# Patient Record
Sex: FEMALE | URBAN - METROPOLITAN AREA
[De-identification: names, ages, dates, MRNs, and addresses within clinical notes are randomized per-mention and may not be internally consistent; named-entity substitution may affect disease eponyms.]

---

## 2024-11-04 ENCOUNTER — TELEPHONE (OUTPATIENT)
Age: 79
End: 2024-11-04

## 2025-01-21 PROBLEM — H25.11 NUCLEAR SCLEROTIC CATARACT OF RIGHT EYE: Status: ACTIVE | Noted: 2023-06-22

## 2025-01-21 PROBLEM — G31.84 MCI (MILD COGNITIVE IMPAIRMENT) WITH MEMORY LOSS: Status: ACTIVE | Noted: 2023-04-05

## 2025-02-05 ENCOUNTER — TELEPHONE (OUTPATIENT)
Age: 80
End: 2025-02-05

## 2025-02-05 NOTE — TELEPHONE ENCOUNTER
LSW attempted to call [facility] Jazlyn who made the appointment to confirm appointment for tomorrow and confirm that someone would be attending appt with patient. LSW was transferred to [facility] nurse Mejia who confirmed appt & would call back LSW to who would be attending appointment with patient as she stated she did not know someone would need to attend with her. No other contacts listed on chart.

## 2025-02-18 RX ORDER — DONEPEZIL HYDROCHLORIDE 10 MG/1
10 TABLET, FILM COATED ORAL
COMMUNITY
Start: 2024-03-20 | End: 2025-02-24 | Stop reason: SDDI

## 2025-02-18 RX ORDER — HYDROXYZINE HYDROCHLORIDE 10 MG/1
10 TABLET, FILM COATED ORAL EVERY 6 HOURS PRN
COMMUNITY
Start: 2025-01-20

## 2025-02-18 NOTE — PROGRESS NOTES
[facility]  [address], [address]  [place] [address]  [phone]    SENIOR CARE CONSULT APPOINTMENT    GERIATRIC EVALUATION - ASSESSMENT AND PLAN:     1. 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)  Assessment & Plan:  MOCA: 16/30  Memory Loss/Deficits: visuospatial, attention, delayed recall   Patient is becoming more dependent with ADLs/dependent with IADLS. Lives with her  and her daughter.   Recent Labs/Imaging: No recent labs will order lab work at this time.  No recent MRI completed will order MRI with neuro quant.  Memory medications: Aricept 10 mg at bedtime, but she is not taking as prescribed.   Weight loss since last visit: Has remained stable.  Safe Environment:    Driving-patient has an active license, family has not allowed her to drive in a significant period of time.  Due to increasing concerns with cognition will submit paperwork for 's license to be revoked.  Falls-reports no issues with ambulation at this time.  No need for PT/OT services at this time.  Medication Management-does not take her medications as prescribed.  She refuses to take majority of her medications, educated the need for medication compliance.  Daughter has attempted to assist patient but she is not willing to take medications.  Caregiver stress: Social work discussed other additional resources in the home with daughter.  Monitor for changes in mood, sleep, pain control.  Notify provider if any concerns  Optimize all acute and chronic conditions.  Continue to follow-up with PCP and specialist as directed for management  Vascular risk factors: N/A  Geriatric syndromes: Cataracts, mild cognitive impairment  Changes in chronic conditions: Continues to follow-up with PCP for management of symptoms.  Ensure adequate hydration, good nutrition  Plan to order labs, MRI with neuro quant, speech  therapy  Recommended next follow up: Scheduled care conference.   Orders:  -     MRI brain NeuroQuant wo contrast; Future; Expected date: 03/03/2025  -     TSH, 3rd generation with Free T4 reflex; Future; Expected date: 03/24/2025  -     Vitamin B12/Folate, Serum Panel; Future; Expected date: 03/24/2025  -     Ambulatory Referral to Speech Therapy; Future  2. Driving safety issue  Assessment & Plan:  Increasing concerns from family, and due to increasing issues with cognitive impairment.  Reports patient is getting lost, and is not able to make appropriate decisions when behind the wheel.  Reports she is not safe behind the wheel at all.  Patient is no longer driving due to these increasing concerns.   Due to increasing concerns for cognition, will order for license to be revoked via [employer].  Patient and daughter agreed with this plan at this time.  Will submit paperwork, educated patient she is no longer to drive.  3. Mood   Patient is pleasant and interactive today in office.   GDS: 2.  Denies SI.  Continues on Atarax every 6 hours as needed, but she refuses to take her medications at times.   Encouraged stress relief techniques, relaxation, and emotional support.   4. Ambulation   Patient reports ambulating without any assistive devices.   Denies any recent falls.   No need for PT/OT at this time.   5. Sleep   Patient reports sleeping well at nighttime.   Does not use any sleep aides for restful sleep.   Encouraged good sleep hygiene, avoid daytime napping, avoiding OTC medications that may cause oversedation/increased confusion.   6. Medication Review   Reviewed medications in patients medication list.   Patients medication list is in compliance with the Beers List for Geriatric Medication recommendations.   Educated to avoid OTC medications such as Benadryl that may increase symptoms of confusion.   Recommended to report any changes in medications to ensure proper medication reconciliation to prevent adverse  events/side effects.       HPI:      Today I had the pleasure of evaluating Monik Hoff who is a 79 y.o. female in a Geriatric Consultation today. Ms. Hoff is in the office with her  daughter Cassandra. She lives with her . Previous MOCA: No previous MoCA on file.  Comorbidities include cataracts and mild cognitive impairment.     Patient's work and family history: she was an .     Memory report per Cassandra (d/t memory issues): she was diagnosed about 3 years ago with dementia. As she is talking she will forget what she is saying mid sentence and cannot pull out what she is supposed to say. She does have a caregiver that comes out to assist, she needs assistance with ADLs/IADLs. Needs to be reoriented, she needs to be reminded to complete tasks. Her  is her caregiver, but he is unable to physically care for her. Recently fell victim to a scam where her account was drained, she does not have financial access anymore. Hoards a lot of papers, will impulsively buy things. Family has really started to take control of her finances so she does not make any choices that may impact her negatively.     Memory report per patient:  her memory is okay, but she does have some short term memory issues. Having more issues with attention. She has her own set routine that she is great with. But does need reminders.     Current activities:  Cognitive:  lots of puzzles, reading, and different activities.  Physical:  not formal exercise, but she does walk a lot.  Social:  goes to senior care center 3x a week.         ADL's:  Do you do your own bathing - independent once reminded, cued   Dressing - independent, needs to be helped will rewear the same outfit for days on end   Feeding yourself - independent   Tolieting - independent   Continence - issues with urinary continence at times   Transferring - not using any assistive devices, no recent falls.       IADL's:   She has problems operating household  "appliance such as TV remote, kitchen appliances, computer.  Can you make your own light meals Yes - issues with leaving the stove on, cannot figure out how to work appliances   Do light cleaning/chores Yes - having increasing issues   Do you take care of your own medications No - she is not taking her medications as prescribed.   Do you do your own shopping No  Do you handle your own financial affairs such as balancing your checkbook, paying bills, investments: No  Do have any difficulties with handling your financial affairs: Yes  Do you use a cell phone Yes  Do you drive: No - does have an active license but does not drive.     Psychosocial concerns:  She has difficulty finding the right word while speaking: Yes  Patient requires repeat information or ask the same question repeatedly: No  Have you or your family noted any change in your mood or personality:No  Are you currently or have you been treated in the past for depression or anxiety: No  Any hallucination or delusion: No  Sleep Issues: No  Hearing and vision issue: No  Appetite/swallow:  appetite is good, no issues with swallowing   Pain:  no pain reported     Family Review of Behavior [facility]:    pacing. Yes    agressive/combative behavior. No    agitated. Yes   wandering. Yes - has her location shared   resistance to care. No   hoarding/hiding objects.Yes    suspicious  No  withdrawn Yes  rummaging/pillaging.No    misplacing/losing objects Yes  personal hygiene problems.Yes - needs to be reminded constantly   forgetfulness of actions Yes     Family/Medical History:   Family member with dementia and what type? Yes - aunts   Does patient have previous diagnosis of dementia?  Cognitive impairment     Does patient take any \"memory medications\"?  Aricept 10 mg at bedtime  Stroke history No  Have you had any head trauma No  Does patient have history of alcohol abuse No    REVIEW OF SYSTEMS:      Review of Systems   HENT:  Positive for hearing loss.    Eyes:  " Positive for visual disturbance.   Gastrointestinal:  Positive for diarrhea (chronic).   Musculoskeletal:  Positive for arthralgias and gait problem.   Psychiatric/Behavioral:  Positive for decreased concentration.    All other systems reviewed and are negative.        OBJECTIVE:       Vitals:  Vitals:    02/24/25 1312   BP: 134/70   Pulse: 74   Temp: 97.6 °F (36.4 °C)   SpO2: 97%       Physical Exam  Observed Ambulation:  independent with no assistive devices   Physical Exam  Constitutional:       General: She is not in acute distress.     Appearance: Normal appearance. She is normal weight. She is not ill-appearing.   HENT:      Head: Normocephalic and atraumatic.   Cardiovascular:      Rate and Rhythm: Normal rate and regular rhythm.      Pulses: Normal pulses.      Heart sounds: Normal heart sounds. No murmur heard.  Pulmonary:      Effort: Pulmonary effort is normal. No respiratory distress.      Breath sounds: Normal breath sounds. No wheezing.   Abdominal:      General: Bowel sounds are normal. There is no distension.      Palpations: Abdomen is soft.      Tenderness: There is no abdominal tenderness.   Musculoskeletal:         General: Normal range of motion.      Cervical back: Normal range of motion and neck supple.      Right lower leg: No edema.      Left lower leg: No edema.   Skin:     General: Skin is warm and dry.      Capillary Refill: Capillary refill takes less than 2 seconds.   Neurological:      General: No focal deficit present.      Mental Status: She is alert and oriented to person, place, and time. Mental status is at baseline.   Psychiatric:         Mood and Affect: Mood normal.         Behavior: Behavior normal.           HISTORY:     History:  History reviewed. No pertinent past medical history.  History reviewed. No pertinent surgical history.  History reviewed. No pertinent family history.  Social History     Socioeconomic History    Marital status: Unknown     Spouse name: Not on file     Number of children: Not on file    Years of education: Not on file    Highest education level: Not on file   Occupational History    Not on file   Tobacco Use    Smoking status: Former     Types: Cigarettes    Smokeless tobacco: Never   Substance and Sexual Activity    Alcohol use: Not on file    Drug use: Not on file    Sexual activity: Not on file   Other Topics Concern    Not on file   Social History Narrative    Not on file     Social Drivers of Health     Financial Resource Strain: Not on file   Food Insecurity: Not on file   Transportation Needs: Not on file   Physical Activity: Not on file   Stress: Not on file   Social Connections: Not on file   Intimate Partner Violence: Not on file   Housing Stability: Not on file     History reviewed. No pertinent surgical history.    Allergies:   Allergies   Allergen Reactions    Nitrofurantoin Anaphylaxis    Sulfa Antibiotics Hives       Medications:      Current Outpatient Medications:     Artificial Tear Solution (GenTeal Tears) 0.1-0.2-0.3 % SOLN, Administer 1 drop to both eyes every 6 (six) hours as needed (dry eyes), Disp: , Rfl:     Cholecalciferol 50 MCG (2000 UT) CAPS, Take 1 capsule by mouth daily, Disp: , Rfl:     hydrOXYzine HCL (ATARAX) 10 mg tablet, Take 10 mg by mouth every 6 (six) hours as needed for anxiety, Disp: , Rfl:     LACTOBACILLUS PROBIOTIC PO, Take 1 capsule by mouth daily, Disp: , Rfl:     Turmeric (QC Tumeric Complex) 500 MG CAPS, Take 1 tablet by mouth daily, Disp: , Rfl:     donepezil (ARICEPT) 10 mg tablet, Take 10 mg by mouth daily at bedtime (Patient not taking: Reported on 2/24/2025), Disp: , Rfl:       Past Medical, surgical, social, medication and allergy history and patients previous records reviewed.      DUGLAS Conner  02/24/25  3:12 PM

## 2025-02-24 ENCOUNTER — APPOINTMENT (OUTPATIENT)
Dept: LAB | Facility: CLINIC | Age: 80
End: 2025-02-24
Payer: MEDICARE

## 2025-02-24 ENCOUNTER — OFFICE VISIT (OUTPATIENT)
Age: 80
End: 2025-02-24
Payer: MEDICARE

## 2025-02-24 VITALS
HEIGHT: 70 IN | SYSTOLIC BLOOD PRESSURE: 134 MMHG | BODY MASS INDEX: 23.62 KG/M2 | DIASTOLIC BLOOD PRESSURE: 70 MMHG | OXYGEN SATURATION: 97 % | WEIGHT: 165 LBS | TEMPERATURE: 97.6 F | HEART RATE: 74 BPM

## 2025-02-24 DIAGNOSIS — G30.1 MILD LATE ONSET ALZHEIMER DEMENTIA WITHOUT BEHAVIORAL DISTURBANCE, PSYCHOTIC DISTURBANCE, MOOD DISTURBANCE, OR ANXIETY (HCC): ICD-10-CM

## 2025-02-24 DIAGNOSIS — F02.A0 MILD LATE ONSET ALZHEIMER DEMENTIA WITHOUT BEHAVIORAL DISTURBANCE, PSYCHOTIC DISTURBANCE, MOOD DISTURBANCE, OR ANXIETY (HCC): Primary | ICD-10-CM

## 2025-02-24 DIAGNOSIS — F02.A0 MILD LATE ONSET ALZHEIMER DEMENTIA WITHOUT BEHAVIORAL DISTURBANCE, PSYCHOTIC DISTURBANCE, MOOD DISTURBANCE, OR ANXIETY (HCC): ICD-10-CM

## 2025-02-24 DIAGNOSIS — G30.1 MILD LATE ONSET ALZHEIMER DEMENTIA WITHOUT BEHAVIORAL DISTURBANCE, PSYCHOTIC DISTURBANCE, MOOD DISTURBANCE, OR ANXIETY (HCC): Primary | ICD-10-CM

## 2025-02-24 DIAGNOSIS — Z91.89 DRIVING SAFETY ISSUE: ICD-10-CM

## 2025-02-24 PROBLEM — F02.B0 MODERATE LATE ONSET ALZHEIMER'S DEMENTIA WITHOUT BEHAVIORAL DISTURBANCE, PSYCHOTIC DISTURBANCE, MOOD DISTURBANCE, OR ANXIETY (HCC): Status: ACTIVE | Noted: 2025-02-24

## 2025-02-24 LAB
FOLATE SERPL-MCNC: >22.3 NG/ML
TSH SERPL DL<=0.05 MIU/L-ACNC: 4.37 UIU/ML (ref 0.45–4.5)
VIT B12 SERPL-MCNC: 228 PG/ML (ref 180–914)

## 2025-02-24 PROCEDURE — 84443 ASSAY THYROID STIM HORMONE: CPT

## 2025-02-24 PROCEDURE — 82607 VITAMIN B-12: CPT

## 2025-02-24 PROCEDURE — 82746 ASSAY OF FOLIC ACID SERUM: CPT

## 2025-02-24 PROCEDURE — 36415 COLL VENOUS BLD VENIPUNCTURE: CPT

## 2025-02-24 PROCEDURE — 99205 OFFICE O/P NEW HI 60 MIN: CPT

## 2025-02-24 RX ORDER — GLIPIZIDE 10 MG/1
1 TABLET ORAL EVERY 6 HOURS PRN
COMMUNITY

## 2025-02-24 RX ORDER — VIT C/B6/B5/MAGNESIUM/HERB 173 50-5-6-5MG
1 CAPSULE ORAL DAILY
COMMUNITY

## 2025-02-24 NOTE — PROGRESS NOTES
[facility]  [address], [address]  [place] [address]  [phone]    Social Work Intake    Monik Hoff presents today for a geriatric assessment, accompanied by daughter, Cassandra.  MSW made aware that patient currently has support and services through [facility] and Cassandra is satisfied with same at this time.  MSW did provide and review additional home care options for any additional support.  However, Cassandra informed that they work closely with [facility]  for in home services and are currently working to increase same.  MSW expressed understanding and made aware that Cassandra has been staying with patient and patient's spouse at this time for additional support.  However, she will likely be moving back to [place] in the upcoming months.  Cassandra had discussed about moving patient and patient's spouse but does not want them to lose their current services.  Additional discussion and support provided during visit regarding same.  The following resources were provided:   -Home care, Waiver, AAA  -OPTIONS Program  -Steps to Apply for Waiver/Resource List  -[employer] brochure  -[facility] brochure    MSW will remain available as needed.

## 2025-02-24 NOTE — ASSESSMENT & PLAN NOTE
MOCA: 16/30  Memory Loss/Deficits: visuospatial, attention, delayed recall   Patient is becoming more dependent with ADLs/dependent with IADLS. Lives with her  and her daughter.   Recent Labs/Imaging: No recent labs will order lab work at this time.  No recent MRI completed will order MRI with neuro quant.  Memory medications: Aricept 10 mg at bedtime, but she is not taking as prescribed.   Weight loss since last visit: Has remained stable.  Safe Environment:    Driving-patient has an active license, family has not allowed her to drive in a significant period of time.  Due to increasing concerns with cognition will submit paperwork for 's license to be revoked.  Falls-reports no issues with ambulation at this time.  No need for PT/OT services at this time.  Medication Management-does not take her medications as prescribed.  She refuses to take majority of her medications, educated the need for medication compliance.  Daughter has attempted to assist patient but she is not willing to take medications.  Caregiver stress: Social work discussed other additional resources in the home with daughter.  Monitor for changes in mood, sleep, pain control.  Notify provider if any concerns  Optimize all acute and chronic conditions.  Continue to follow-up with PCP and specialist as directed for management  Vascular risk factors: N/A  Geriatric syndromes: Cataracts, mild cognitive impairment  Changes in chronic conditions: Continues to follow-up with PCP for management of symptoms.  Ensure adequate hydration, good nutrition  Plan to order labs, MRI with neuro quant, speech therapy  Recommended next follow up: Scheduled care conference.

## 2025-02-24 NOTE — ASSESSMENT & PLAN NOTE
Increasing concerns from family, and due to increasing issues with cognitive impairment.  Reports patient is getting lost, and is not able to make appropriate decisions when behind the wheel.  Reports she is not safe behind the wheel at all.  Patient is no longer driving due to these increasing concerns.   Due to increasing concerns for cognition, will order for license to be revoked via [employer].  Patient and daughter agreed with this plan at this time.  Will submit paperwork, educated patient she is no longer to drive.

## 2025-02-26 ENCOUNTER — TELEPHONE (OUTPATIENT)
Age: 80
End: 2025-02-26

## 2025-02-26 NOTE — TELEPHONE ENCOUNTER
Called patient's daughter Cassnadra ESPOSITO that we received message from [facility] and they said that we are not able to provide service due to insurance, her insurance is extremely specific and [facility]

## 2025-02-26 NOTE — TELEPHONE ENCOUNTER
[facility] and [facility] are unable to provider the speech therapy for this pt due to her insurance the [facility] insurance is extremely specific and can only be done through a [facility] excepted insurance

## 2025-02-27 ENCOUNTER — TELEPHONE (OUTPATIENT)
Dept: LAB | Facility: HOSPITAL | Age: 80
End: 2025-02-27

## 2025-03-27 ENCOUNTER — APPOINTMENT (EMERGENCY)
Dept: RADIOLOGY | Facility: HOSPITAL | Age: 80
End: 2025-03-27
Payer: MEDICARE

## 2025-03-27 ENCOUNTER — APPOINTMENT (EMERGENCY)
Dept: CT IMAGING | Facility: HOSPITAL | Age: 80
End: 2025-03-27
Payer: MEDICARE

## 2025-03-27 ENCOUNTER — HOSPITAL ENCOUNTER (EMERGENCY)
Facility: HOSPITAL | Age: 80
Discharge: HOME | End: 2025-03-27
Attending: EMERGENCY MEDICINE
Payer: MEDICARE

## 2025-03-27 VITALS
OXYGEN SATURATION: 94 % | TEMPERATURE: 98 F | RESPIRATION RATE: 18 BRPM | HEART RATE: 79 BPM | DIASTOLIC BLOOD PRESSURE: 88 MMHG | SYSTOLIC BLOOD PRESSURE: 120 MMHG

## 2025-03-27 DIAGNOSIS — N94.9 ADNEXAL CYST: ICD-10-CM

## 2025-03-27 DIAGNOSIS — J10.1 INFLUENZA A: ICD-10-CM

## 2025-03-27 DIAGNOSIS — R93.89 THICKENED ENDOMETRIUM: ICD-10-CM

## 2025-03-27 DIAGNOSIS — R10.9 ABDOMINAL PAIN: Primary | ICD-10-CM

## 2025-03-27 DIAGNOSIS — R91.1 PULMONARY NODULE: ICD-10-CM

## 2025-03-27 LAB
ALBUMIN SERPL BCG-MCNC: 4.3 G/DL (ref 3.5–5)
ALP SERPL-CCNC: 36 U/L (ref 34–104)
ALT SERPL W P-5'-P-CCNC: 30 U/L (ref 7–52)
ANION GAP SERPL CALCULATED.3IONS-SCNC: 11 MMOL/L (ref 4–13)
AST SERPL W P-5'-P-CCNC: 24 U/L (ref 13–39)
BASOPHILS # BLD AUTO: 0.04 THOUSANDS/ÂΜL (ref 0–0.1)
BASOPHILS NFR BLD AUTO: 1 % (ref 0–1)
BILIRUB SERPL-MCNC: 0.39 MG/DL (ref 0.2–1)
BUN SERPL-MCNC: 18 MG/DL (ref 5–25)
CALCIUM SERPL-MCNC: 10.2 MG/DL (ref 8.4–10.2)
CHLORIDE SERPL-SCNC: 99 MMOL/L (ref 96–108)
CO2 SERPL-SCNC: 28 MMOL/L (ref 21–32)
CREAT SERPL-MCNC: 1.11 MG/DL (ref 0.6–1.3)
EOSINOPHIL # BLD AUTO: 0.11 THOUSAND/ÂΜL (ref 0–0.61)
EOSINOPHIL NFR BLD AUTO: 1 % (ref 0–6)
ERYTHROCYTE [DISTWIDTH] IN BLOOD BY AUTOMATED COUNT: 12.8 % (ref 11.6–15.1)
GFR SERPL CREATININE-BSD FRML MDRD: 47 ML/MIN/1.73SQ M
GLUCOSE SERPL-MCNC: 85 MG/DL (ref 65–140)
HCT VFR BLD AUTO: 44.7 % (ref 34.8–46.1)
HGB BLD-MCNC: 14.5 G/DL (ref 11.5–15.4)
IMM GRANULOCYTES # BLD AUTO: 0.03 THOUSAND/UL (ref 0–0.2)
IMM GRANULOCYTES NFR BLD AUTO: 0 % (ref 0–2)
LIPASE SERPL-CCNC: 39 U/L (ref 11–82)
LYMPHOCYTES # BLD AUTO: 3.67 THOUSANDS/ÂΜL (ref 0.6–4.47)
LYMPHOCYTES NFR BLD AUTO: 42 % (ref 14–44)
MCH RBC QN AUTO: 29.2 PG (ref 26.8–34.3)
MCHC RBC AUTO-ENTMCNC: 32.4 G/DL (ref 31.4–37.4)
MCV RBC AUTO: 90 FL (ref 82–98)
MONOCYTES # BLD AUTO: 0.55 THOUSAND/ÂΜL (ref 0.17–1.22)
MONOCYTES NFR BLD AUTO: 6 % (ref 4–12)
NEUTROPHILS # BLD AUTO: 4.33 THOUSANDS/ÂΜL (ref 1.85–7.62)
NEUTS SEG NFR BLD AUTO: 50 % (ref 43–75)
NRBC BLD AUTO-RTO: 0 /100 WBCS
PLATELET # BLD AUTO: 182 THOUSANDS/UL (ref 149–390)
PMV BLD AUTO: 10.3 FL (ref 8.9–12.7)
POTASSIUM SERPL-SCNC: 3.8 MMOL/L (ref 3.5–5.3)
PROCALCITONIN SERPL-MCNC: 0.15 NG/ML
PROT SERPL-MCNC: 7.3 G/DL (ref 6.4–8.4)
RBC # BLD AUTO: 4.97 MILLION/UL (ref 3.81–5.12)
SODIUM SERPL-SCNC: 138 MMOL/L (ref 135–147)
WBC # BLD AUTO: 8.73 THOUSAND/UL (ref 4.31–10.16)

## 2025-03-27 PROCEDURE — 96365 THER/PROPH/DIAG IV INF INIT: CPT

## 2025-03-27 PROCEDURE — 94640 AIRWAY INHALATION TREATMENT: CPT

## 2025-03-27 PROCEDURE — 36415 COLL VENOUS BLD VENIPUNCTURE: CPT

## 2025-03-27 PROCEDURE — 99284 EMERGENCY DEPT VISIT MOD MDM: CPT

## 2025-03-27 PROCEDURE — 71046 X-RAY EXAM CHEST 2 VIEWS: CPT

## 2025-03-27 PROCEDURE — 74177 CT ABD & PELVIS W/CONTRAST: CPT

## 2025-03-27 PROCEDURE — 84145 PROCALCITONIN (PCT): CPT

## 2025-03-27 PROCEDURE — 85025 COMPLETE CBC W/AUTO DIFF WBC: CPT

## 2025-03-27 PROCEDURE — 83690 ASSAY OF LIPASE: CPT

## 2025-03-27 PROCEDURE — 80053 COMPREHEN METABOLIC PANEL: CPT

## 2025-03-27 RX ORDER — IPRATROPIUM BROMIDE AND ALBUTEROL SULFATE 2.5; .5 MG/3ML; MG/3ML
3 SOLUTION RESPIRATORY (INHALATION) ONCE
Status: COMPLETED | OUTPATIENT
Start: 2025-03-27 | End: 2025-03-27

## 2025-03-27 RX ORDER — ACETAMINOPHEN 325 MG/1
650 TABLET ORAL EVERY 4 HOURS PRN
Status: DISCONTINUED | OUTPATIENT
Start: 2025-03-27 | End: 2025-03-27 | Stop reason: HOSPADM

## 2025-03-27 RX ORDER — B-COMPLEX WITH VITAMIN C
1 TABLET ORAL DAILY
COMMUNITY

## 2025-03-27 RX ADMIN — SODIUM CHLORIDE, SODIUM LACTATE, POTASSIUM CHLORIDE, AND CALCIUM CHLORIDE 500 ML: .6; .31; .03; .02 INJECTION, SOLUTION INTRAVENOUS at 18:17

## 2025-03-27 RX ADMIN — IPRATROPIUM BROMIDE AND ALBUTEROL SULFATE 3 ML: .5; 3 SOLUTION RESPIRATORY (INHALATION) at 18:01

## 2025-03-27 RX ADMIN — IOHEXOL 100 ML: 350 INJECTION, SOLUTION INTRAVENOUS at 20:14

## 2025-03-27 NOTE — ED PROVIDER NOTES
"  ED Disposition       None          Assessment & Plan       Medical Decision Making  79-year-old female presenting to the emergency department for shortness of breath and abdominal pain.  She and her  tested positive for flu A earlier in the week.  They were placed on Tamiflu starting yesterday.  She was placed on a smaller dose due to concern for renal dysfunction.  She has not had fevers in at least 24 hours.  She reports that she is starting to feel slightly better but when the visiting nurse came today they were concerned for adventitious breath sounds.  She has had decreased appetite.  Decreased oral intake.  Denies nausea, vomiting, diarrhea, dysuria, hematuria.  States that she has pain over her right upper quadrant/right side.  Denies anything making this pain worse including eating.  States that her  tripped yesterday and nearly fell but she caught him.  She is concerned that he may have bumped her side and she may have \"pulled\" something.    CBC, CMP, lipase, Pro-Neymar, CXR  CT vs RUQ US depending on LFTs.   Signed out to Jennifer Parker PA-C pending labs and imaging.       Portions of the record may have been created with voice recognition software. Occasional use of the incorrect word or \"sound a like\" substitutions may have occurred due to the inherent limitations of voice recognition software. Read the chart carefully and recognize, using context, where substitutions have occurred.         Amount and/or Complexity of Data Reviewed  Labs: ordered.  Radiology: ordered.    Risk  OTC drugs.  Prescription drug management.             Medications   lactated ringers bolus 500 mL (500 mL Intravenous New Bag 3/27/25 1817)   acetaminophen (TYLENOL) tablet 650 mg (has no administration in time range)   ipratropium-albuterol (DUO-NEB) 0.5-2.5 mg/3 mL inhalation solution 3 mL (3 mL Nebulization Given 3/27/25 1801)       ED Risk Strat Scores                                                History of Present " Illness       Chief Complaint   Patient presents with    Flu Symptoms     Pt diagnosed with flu A on Tuesday, per daughter/POA, visiting nurse heard fluid in lungs and sent them here to r/o pneumonia. Hx dementia,     Abdominal Pain     Reports right abd flank pain since yesterday.       History reviewed. No pertinent past medical history.   History reviewed. No pertinent surgical history.   History reviewed. No pertinent family history.   Social History     Tobacco Use    Smoking status: Former     Types: Cigarettes    Smokeless tobacco: Never      E-Cigarette/Vaping      E-Cigarette/Vaping Substances      I have reviewed and agree with the history as documented.     Patient presents with:  Flu Symptoms: Pt diagnosed with flu A on Tuesday, per daughter/POA, visiting nurse heard fluid in lungs and sent them here to r/o pneumonia. Hx dementia,   Abdominal Pain: Reports right abd flank pain since yesterday.          Flu Symptoms  Presenting symptoms: cough and fatigue    Presenting symptoms: no diarrhea, no fever, no headaches, no nausea, no rhinorrhea, no shortness of breath, no sore throat and no vomiting    Associated symptoms: no chills, no ear pain and no congestion    Abdominal Pain  Associated symptoms: cough and fatigue    Associated symptoms: no chest pain, no chills, no constipation, no diarrhea, no dysuria, no fever, no nausea, no shortness of breath, no sore throat and no vomiting        Review of Systems   Constitutional:  Positive for fatigue. Negative for chills and fever.   HENT:  Negative for congestion, ear pain, postnasal drip, rhinorrhea and sore throat.    Eyes:  Negative for pain and visual disturbance.   Respiratory:  Positive for cough. Negative for choking, chest tightness and shortness of breath.    Cardiovascular:  Negative for chest pain and palpitations.   Gastrointestinal:  Positive for abdominal pain. Negative for blood in stool, constipation, diarrhea, nausea and vomiting.    Genitourinary:  Negative for decreased urine volume, dysuria and urgency.   Musculoskeletal:  Negative for back pain.   Skin:  Negative for rash.   Neurological:  Negative for light-headedness and headaches.   All other systems reviewed and are negative.          Objective       ED Triage Vitals [03/27/25 1632]   Temperature Pulse Blood Pressure Respirations SpO2 Patient Position - Orthostatic VS   98 °F (36.7 °C) 84 147/63 18 96 % Sitting      Temp Source Heart Rate Source BP Location FiO2 (%) Pain Score    Oral Monitor Right arm -- --      Vitals      Date and Time Temp Pulse SpO2 Resp BP Pain Score FACES Pain Rating User   03/27/25 1632 98 °F (36.7 °C) 84 96 % 18 147/63 -- -- DW            Physical Exam  Vitals and nursing note reviewed.   Constitutional:       General: She is not in acute distress.     Appearance: She is well-developed. She is ill-appearing.   HENT:      Head: Normocephalic and atraumatic.   Eyes:      Conjunctiva/sclera: Conjunctivae normal.   Cardiovascular:      Rate and Rhythm: Normal rate and regular rhythm.      Heart sounds: No murmur heard.  Pulmonary:      Effort: Pulmonary effort is normal. No respiratory distress.      Breath sounds: Examination of the right-upper field reveals wheezing. Examination of the left-upper field reveals wheezing. Wheezing present. No decreased breath sounds, rhonchi or rales.   Abdominal:      General: There is no distension.      Palpations: Abdomen is soft. There is no shifting dullness.      Tenderness: There is abdominal tenderness in the right upper quadrant. There is no right CVA tenderness, left CVA tenderness, guarding or rebound. Negative signs include Wallace's sign and McBurney's sign.      Hernia: No hernia is present.   Musculoskeletal:         General: No swelling.      Cervical back: Neck supple.   Skin:     General: Skin is warm and dry.      Capillary Refill: Capillary refill takes less than 2 seconds.   Neurological:      Mental Status:  She is alert.   Psychiatric:         Mood and Affect: Mood normal.         Results Reviewed       Procedure Component Value Units Date/Time    CBC and differential [[id number]] Collected: 03/27/25 1825    Lab Status: Final result Specimen: Blood from Arm, Left Updated: 03/27/25 1838     WBC 8.73 Thousand/uL      RBC 4.97 Million/uL      Hemoglobin 14.5 g/dL      Hematocrit 44.7 %      MCV 90 fL      MCH 29.2 pg      MCHC 32.4 g/dL      RDW 12.8 %      MPV 10.3 fL      Platelets 182 Thousands/uL      nRBC 0 /100 WBCs      Segmented % 50 %      Immature Grans % 0 %      Lymphocytes % 42 %      Monocytes % 6 %      Eosinophils Relative 1 %      Basophils Relative 1 %      Absolute Neutrophils 4.33 Thousands/µL      Absolute Immature Grans 0.03 Thousand/uL      Absolute Lymphocytes 3.67 Thousands/µL      Absolute Monocytes 0.55 Thousand/µL      Eosinophils Absolute 0.11 Thousand/µL      Basophils Absolute 0.04 Thousands/µL     Procalcitonin [[id number]] Collected: 03/27/25 1825    Lab Status: In process Specimen: Blood from Arm, Left Updated: 03/27/25 1834    Comprehensive metabolic panel [[id number]] Collected: 03/27/25 1825    Lab Status: In process Specimen: Blood from Arm, Left Updated: 03/27/25 1834    Lipase [[id number]] Collected: 03/27/25 1825    Lab Status: In process Specimen: Blood from Arm, Left Updated: 03/27/25 1834            XR chest 2 views   Final Interpretation by Sanjeev Leong MD (03/27 1813)      No acute cardiopulmonary disease.   11 mm nodular opacity in the right midlung may reflect a focally prominent pulmonary vessel, however nodule is not excluded. Recommend nonemergent CT for further evaluation. Alternatively, prior outside exams could be obtained for direct comparison.      The study was marked in EPIC for immediate notification.         Workstation performed: [id number]             Procedures    ED Medication and Procedure Management   Prior to Admission Medications   Prescriptions Last  Dose Informant Patient Reported? Taking?   Artificial Tear Solution (GenTeal Tears) 0.1-0.2-0.3 % SOLN   Yes No   Sig: Administer 1 drop to both eyes every 6 (six) hours as needed (dry eyes)   Cholecalciferol 50 MCG (2000 UT) CAPS   Yes No   Sig: Take 1 capsule by mouth daily   LACTOBACILLUS PROBIOTIC PO   Yes No   Sig: Take 1 capsule by mouth daily   Turmeric (QC Tumeric Complex) 500 MG CAPS   Yes No   Sig: Take 1 tablet by mouth daily   hydrOXYzine HCL (ATARAX) 10 mg tablet   Yes No   Sig: Take 10 mg by mouth every 6 (six) hours as needed for anxiety      Facility-Administered Medications: None     Patient's Medications   Discharge Prescriptions    No medications on file     No discharge procedures on file.  ED SEPSIS DOCUMENTATION            Cassandra Hodgson PA-C  03/27/25 8523

## 2025-03-27 NOTE — ED NOTES
Family (daughter) at bedside intermittently between both parents     Maria Teresa Short RN  03/27/25 6265

## 2025-03-28 NOTE — INCIDENTAL FINDINGS
The following findings require follow up:  Radiographic finding   Finding: endometrial thickening; bilateral adnexal cystic lesions   Follow up required: OBGYN follow up, pelvic US   Follow up should be done within 6 month(s)    Please notify the following clinician to assist with the follow up:   DUGLAS Gutiérrez    Incidental finding results were discussed with the Patient and Patient's POA by Jennifer Parker PA-C on 03/27/25.   They expressed understanding and all questions answered.

## 2025-03-28 NOTE — INCIDENTAL FINDINGS
The following findings require follow up:  Radiographic finding   Findin mm nodular opacity   Follow up required: non emergent chest CT   Follow up should be done within 6 month(s)    Please notify the following clinician to assist with the follow up:   DUGLAS Gutiérrez     Incidental finding results were discussed with the Patient and Patient's POA by Jennifer Parker PA-C on 25.   They expressed understanding and all questions answered.

## 2025-03-28 NOTE — DISCHARGE INSTRUCTIONS
Chest xray showed 11 mm nodule in the right lung. Recommend CT scan performed outpatient     CT scan also showed abnormal endometrial thickening and bilateral cystic lesions in the adnexa. Recommend OBGYN consult and pelvic ultrasound outpatient

## 2025-04-10 ENCOUNTER — HOSPITAL ENCOUNTER (OUTPATIENT)
Dept: MRI IMAGING | Facility: HOSPITAL | Age: 80
Discharge: HOME | End: 2025-04-10
Payer: MEDICARE

## 2025-04-10 DIAGNOSIS — G30.1 MILD LATE ONSET ALZHEIMER DEMENTIA WITHOUT BEHAVIORAL DISTURBANCE, PSYCHOTIC DISTURBANCE, MOOD DISTURBANCE, OR ANXIETY (HCC): ICD-10-CM

## 2025-04-10 DIAGNOSIS — F02.A0 MILD LATE ONSET ALZHEIMER DEMENTIA WITHOUT BEHAVIORAL DISTURBANCE, PSYCHOTIC DISTURBANCE, MOOD DISTURBANCE, OR ANXIETY (HCC): ICD-10-CM

## 2025-04-10 PROCEDURE — 70551 MRI BRAIN STEM W/O DYE: CPT

## 2025-04-11 ENCOUNTER — TELEPHONE (OUTPATIENT)
Age: 80
End: 2025-04-11

## 2025-04-15 ENCOUNTER — OFFICE VISIT (OUTPATIENT)
Age: 80
End: 2025-04-15
Payer: MEDICARE

## 2025-04-15 VITALS
TEMPERATURE: 98.1 F | WEIGHT: 157.4 LBS | DIASTOLIC BLOOD PRESSURE: 70 MMHG | OXYGEN SATURATION: 96 % | HEIGHT: 67 IN | BODY MASS INDEX: 24.71 KG/M2 | SYSTOLIC BLOOD PRESSURE: 138 MMHG | HEART RATE: 69 BPM

## 2025-04-15 DIAGNOSIS — F02.A0 MILD LATE ONSET ALZHEIMER'S DEMENTIA WITHOUT BEHAVIORAL DISTURBANCE, PSYCHOTIC DISTURBANCE, MOOD DISTURBANCE, OR ANXIETY (HCC): Primary | ICD-10-CM

## 2025-04-15 DIAGNOSIS — G30.1 MILD LATE ONSET ALZHEIMER'S DEMENTIA WITHOUT BEHAVIORAL DISTURBANCE, PSYCHOTIC DISTURBANCE, MOOD DISTURBANCE, OR ANXIETY (HCC): Primary | ICD-10-CM

## 2025-04-15 PROCEDURE — 99483 ASSMT & CARE PLN PT COG IMP: CPT

## 2025-04-15 RX ORDER — CYANOCOBALAMIN (VITAMIN B-12) 500 MCG
500 TABLET ORAL DAILY
Qty: 90 TABLET | Refills: 0 | Status: SHIPPED | OUTPATIENT
Start: 2025-04-15

## 2025-04-15 NOTE — PROGRESS NOTES
"[facility]  [address], [address]  [place] [address] [phone]    Care Conference: Resources Provided    LSW participated in today's conference and provided the following resources, along with a copy of the care plan:  [address] [place] [id number]    General Information  - 10 Ways to Love Your Brain  - Memory loss ladder  - Packet with Alzheimer's Association information including: definition of dementia, communication tips for different stages, common behaviors and response strategies  - NIH MEDARDO \"Now What? Next Steps After an Alzheimer's Diagnosis\"    Caregiver Support  - Flyer for [facility] Virtual Caregiver Support Group (meeting 2x per month by Zoom)  - Alzheimer's Association Rx Pad (guide to website and 24/7 helpline, 1-104.454.5027)    Safety  - CDC fall prevention / home safety checklist    Community Resources  - [facility] in Place Resource Guide (contains information about higher levels of care, homecare, etc.)  - [employer] [facility]: Dementia Resource Guide & Brochure    Resources provided at initial assessment:   -Home care, Waiver, AAA  -OPTIONS Program  -Steps to Apply for Waiver/Resource List  -Care Patrol brochure  -[facility] brochure  "

## 2025-04-15 NOTE — PROGRESS NOTES
[facility]  [address], [address]  [place] [address]  [phone]    Care Conference    NAME: Monik Hoff AGE: 79 y.o. SEX: female  YOB: 1945  DATE OF ASSESSMENT: 02/24/25  DATE OF CONFERENCE: 04/15/25    Family Present: Ladi, her children.  Staff Present: DUGLAS Gutiérrez    Patient / Family Goals of Care: Review cognitive decline and associated symptoms, discuss treatment options and care planning.     Medical Concerns (Current/Historical): Driving safety issue    Geriatric Syndromes/Age Related Syndromes: Mild late onset Alzheimer's dementia without behavioral disturbance    Neuropsychological  Staging: Mild late onset Alzheimer's dementia without behavioral disturbance  [place] Cognitive Assessment: 16/30, deficits in: visuospatial, attention, delayed recall   Geriatric Depression Screen: 2/15    Safe Environment:    Falls- reports some issues with ambulation at times, will order PT   Medication Management-does not take her medications as prescribed.  She refuses to take majority of her medications, educated the need for medication compliance.  Daughter has attempted to assist patient but she is not willing to take medications.  Monitor for changes in mood, sleep, pain control.  Notify provider if any concerns  Optimize all acute and chronic conditions.  Continue to follow-up with PCP and specialist as directed for management  Ensure adequate hydration, good nutrition    Decision-making capacity: Limited, would recommend further evaluation.   Caregiver Review: Social work discussed other additional resources in the home with daughter.  Driving Safety: patient has an active license, family has not allowed her to drive in a significant period of time. Submitted to the DMV recommendations for her to no longer drive. No longer has an active license.   ACP Review: POA and Living Will in place     Remain active physically, mentally and  socially  Pharmaceutical and non-pharmaceutical interventions discussed  Engage in cognitively challenging exercises such as crosswords and puzzles  Maintain chronic conditions under control  Repeat cognitive assessment in 6 months  Will place ST orders to intervene with cognitive decline       Diagnostic Studies  Review of bloodwork: Labs WNL, B12 at 228 would recommend B12 supplement OTC to support brain health.   Review of MRI NeuroQuant: IMPRESSION: No mass effect, acute intracranial hemorrhage or evidence of recent infarction. Minimal chronic microvascular ischemic change. NeuroQuant analysis was performed: Normal hippocampal volume and enlarged ventricular system: Findings do not support hippocampal degeneration. Possible expansion of ventricular system without medial temporal lobe focused ex-vacuo process.    Physical Finding Impacting Function   Fall Risk: Moderate   Activities of Daily Living: some assist   Instrumental Activities of Daily Living: Dependent    Encourage appropriate footwear at all times  Review fall risk prevention tips and adjust within the home environment as needed    Medications Reviewed   Reviewed medications in patients medication list.   Patients medication list is in compliance with the Beers List for Geriatric Medication recommendations.   Educated to avoid OTC medications such as Benadryl that may increase symptoms of confusion.   Recommended to report any changes in medications to ensure proper medication reconciliation to prevent adverse events/side effects    Other Findings   Overall health  BMI: 23.68 kg/m2  Maintain well-balanced diet  Continue following with primary care physician regularly  Mood  Patient is pleasant and interactive today in office.   GDS: 2.  Denies SI.  Continues on Atarax every 6 hours as needed, but she refuses to take her medications at times.   Encouraged stress relief techniques, relaxation, and emotional support.   Ambulation  Patient reports  ambulating without any assistive devices.   Denies any recent falls.   Will order PT at this time to help with ambulation    Sleep  Patient reports sleeping well at nighttime.   Does not use any sleep aides for restful sleep.   Encouraged good sleep hygiene, avoid daytime napping, avoiding OTC medications that may cause oversedation/increased confusion.       Social /safety recommendations & considerations  Consider an Adult Day Program  for positive socialization, physical exercise, cognitive stimulation and family respite  Consider a home care aide to assist with daily care needs  Stay in touch with family and friends  Plan self-care activities for your mental well-being each week  Recommend review of fall risk prevention tips  Recommend use of fall precautions including fall alert device  Scam safety: do not answer suspicious mail, phone calls, email or text messages without having another person review for safety  Consider assistance for medication administration such as blister packaging or use of an automated pill dispenser  Recommend contacting your local [facility] Agency on Aging for possible eligible programs such as OPTIONS, Caregiver Support Program, or WAIVER    Long term care recommendations  Maintain updated advance directives and provide a copy to your primary care provider  Consider caregiver support groups and educational resources through the Alzheimer's Association; access Alzheimer's Association 24/7 Helpline at 1-611.766.2507  [facility] does offer a monthly caregiver support group. If interested, please speak with a .  Utilize reorientation and redirection as needed (dependent on situation)  Review educational information provided  Recommended literature: 36 Hour Day, Untangling Alzheimer's, Learning to Speak Alzheimer's    Patient and family verbalized understanding of above care plan and in agreement with plan. For care coordination purposes, this care  plan will be shared with your primary care provider. With any questions, please contact our office at [phone].     I have spent a total time of 40 minutes in caring for this patient on the day of the visit/encounter including Diagnostic results, Prognosis, Instructions for management, Patient and family education, Importance of tx compliance, Risk factor reductions, Impressions, Counseling / Coordination of care, Documenting in the medical record, and Reviewing/placing orders in the medical record (including tests, medications, and/or procedures).

## 2025-04-15 NOTE — PATIENT INSTRUCTIONS
[facility]  [address], [address]  [place] [address]  [phone]     Care Conference     NAME: Monik Hoff AGE: 79 y.o. SEX: female  YOB: 1945  DATE OF ASSESSMENT: 02/24/25  DATE OF CONFERENCE: 04/15/25     Family Present: Ladi, her children.  Staff Present: DUGLAS Gutiérrez     Patient / Family Goals of Care: Review cognitive decline and associated symptoms, discuss treatment options and care planning.      Medical Concerns (Current/Historical): Driving safety issue     Geriatric Syndromes/Age Related Syndromes: Mild late onset Alzheimer's dementia without behavioral disturbance     Neuropsychological  Staging: Mild late onset Alzheimer's dementia without behavioral disturbance  [place] Cognitive Assessment: 16/30, deficits in: visuospatial, attention, delayed recall   Geriatric Depression Screen: 2/15     Safe Environment:    Falls- reports some issues with ambulation at times, will order PT   Medication Management-does not take her medications as prescribed.  She refuses to take majority of her medications, educated the need for medication compliance.  Daughter has attempted to assist patient but she is not willing to take medications.  Monitor for changes in mood, sleep, pain control.  Notify provider if any concerns  Optimize all acute and chronic conditions.  Continue to follow-up with PCP and specialist as directed for management  Ensure adequate hydration, good nutrition     Decision-making capacity: Limited, would recommend further evaluation.   Caregiver Review: Social work discussed other additional resources in the home with daughter.  Driving Safety: patient has an active license, family has not allowed her to drive in a significant period of time. Submitted to the DMV recommendations for her to no longer drive. No longer has an active license.   ACP Review: POA and Living Will in place      Remain active physically, mentally and  socially  Pharmaceutical and non-pharmaceutical interventions discussed  Engage in cognitively challenging exercises such as crosswords and puzzles  Maintain chronic conditions under control  Repeat cognitive assessment in 6 months  Will place ST orders to intervene with cognitive decline         Diagnostic Studies  Review of bloodwork: Labs WNL, B12 at 228 would recommend B12 supplement OTC to support brain health.   Review of MRI NeuroQuant: IMPRESSION: No mass effect, acute intracranial hemorrhage or evidence of recent infarction. Minimal chronic microvascular ischemic change. NeuroQuant analysis was performed: Normal hippocampal volume and enlarged ventricular system: Findings do not support hippocampal degeneration. Possible expansion of ventricular system without medial temporal lobe focused ex-vacuo process.     Physical Finding Impacting Function              Fall Risk: Moderate              Activities of Daily Living: some assist              Instrumental Activities of Daily Living: Dependent     Encourage appropriate footwear at all times  Review fall risk prevention tips and adjust within the home environment as needed     Medications Reviewed   Reviewed medications in patients medication list.   Patients medication list is in compliance with the Beers List for Geriatric Medication recommendations.   Educated to avoid OTC medications such as Benadryl that may increase symptoms of confusion.   Recommended to report any changes in medications to ensure proper medication reconciliation to prevent adverse events/side effects     Other Findings              Overall health  BMI: 23.68 kg/m2  Maintain well-balanced diet  Continue following with primary care physician regularly  Mood  Patient is pleasant and interactive today in office.   GDS: 2.  Denies SI.  Continues on Atarax every 6 hours as needed, but she refuses to take her medications at times.   Encouraged stress relief techniques, relaxation, and  emotional support.   Ambulation  Patient reports ambulating without any assistive devices.   Denies any recent falls.   Will order PT at this time to help with ambulation    Sleep  Patient reports sleeping well at nighttime.   Does not use any sleep aides for restful sleep.   Encouraged good sleep hygiene, avoid daytime napping, avoiding OTC medications that may cause oversedation/increased confusion.        Social /safety recommendations & considerations  Consider an Adult Day Program  for positive socialization, physical exercise, cognitive stimulation and family respite  Consider a home care aide to assist with daily care needs  Stay in touch with family and friends  Plan self-care activities for your mental well-being each week  Recommend review of fall risk prevention tips  Recommend use of fall precautions including fall alert device  Scam safety: do not answer suspicious mail, phone calls, email or text messages without having another person review for safety  Consider assistance for medication administration such as blister packaging or use of an automated pill dispenser  Recommend contacting your local [facility] Agency on Aging for possible eligible programs such as OPTIONS, Caregiver Support Program, or WAIVER     Long term care recommendations  Maintain updated advance directives and provide a copy to your primary care provider  Consider caregiver support groups and educational resources through the Alzheimer's Association; access Alzheimer's Association 24/7 Helpline at 1-854.851.4854  [facility] does offer a monthly caregiver support group. If interested, please speak with a .  Utilize reorientation and redirection as needed (dependent on situation)  Review educational information provided  Recommended literature: 36 Hour Day, Untangling Alzheimer's, Learning to Speak Alzheimer's     Patient and family verbalized understanding of above care plan and in agreement  with plan. For care coordination purposes, this care plan will be shared with your primary care provider. With any questions, please contact our office at [phone].

## 2025-04-22 ENCOUNTER — HOSPITAL ENCOUNTER (OUTPATIENT)
Dept: RADIOLOGY | Facility: HOSPITAL | Age: 80
Discharge: HOME | End: 2025-04-22
Payer: MEDICARE

## 2025-04-22 DIAGNOSIS — R05.1 ACUTE COUGH: ICD-10-CM

## 2025-04-22 PROCEDURE — 71046 X-RAY EXAM CHEST 2 VIEWS: CPT

## 2025-05-01 NOTE — PROGRESS NOTES
"Subjective    Patient is an 78 yo who presents today to discuss a thickened endometrial lining that was noted on a CT scan. The CT scan was originally obtained in the setting of abdominal pain and shortness of breath, as well as flu A. She denies any current abdominal pain. She denies any bleeding since menopause, other than some bleeding associated with a pessary 20 years ago. She no longer uses a pessary.    The CT scan on 3/27/25 demonstrated:     PELVIS     REPRODUCTIVE ORGANS: Endometrial thickening to 1.5 cm.     6.3 cm right adnexal cystic lesion.     3.1 cm left adnexal cystic lesion.     URINARY BLADDER: Unremarkable.     ABDOMINAL WALL/INGUINAL REGIONS: Unremarkable.     BONES: No acute fracture or suspicious osseous lesion. Facet arthrosis of the lower lumbar spine.     IMPRESSION:     1.  No evident cause for patient's upper abdominal pain.  2.  Abnormal endometrial thickening. Bilateral adnexal cystic lesions. Outpatient OB/GYN consultation and pelvic ultrasound is recommended for further assessment.    OB History          2    Para        Term                AB        Living   2         SAB        IAB        Ectopic        Multiple        Live Births   2                        Objective    Vitals:    25 1346   BP: 124/74   BP Location: Right arm   Patient Position: Sitting   Cuff Size: Standard   Weight: 70.3 kg (155 lb)   Height: 5' 7\" (1.702 m)        Physical Exam  Constitutional:       Appearance: She is well-developed.   Cardiovascular:      Rate and Rhythm: Normal rate.   Pulmonary:      Effort: Pulmonary effort is normal. No respiratory distress.   Skin:     General: Skin is warm and dry.          Assessment    Patient Active Problem List   Diagnosis    MCI (mild cognitive impairment) with memory loss    Nuclear sclerotic cataract of right eye    Mild late onset Alzheimer's dementia without behavioral disturbance, psychotic disturbance, mood disturbance, or anxiety (HCC)    " Driving safety issue        Plan  - Pelvic US ordered  - Reviewed possible causes of thickened endometrial lining

## 2025-05-05 ENCOUNTER — CONSULT (OUTPATIENT)
Dept: OBGYN CLINIC | Facility: CLINIC | Age: 80
End: 2025-05-05
Payer: MEDICARE

## 2025-05-05 VITALS
DIASTOLIC BLOOD PRESSURE: 74 MMHG | SYSTOLIC BLOOD PRESSURE: 124 MMHG | WEIGHT: 155 LBS | HEIGHT: 67 IN | BODY MASS INDEX: 24.33 KG/M2

## 2025-05-05 DIAGNOSIS — N94.9 ADNEXAL CYST: ICD-10-CM

## 2025-05-05 DIAGNOSIS — R93.89 THICKENED ENDOMETRIUM: ICD-10-CM

## 2025-05-05 PROCEDURE — 99203 OFFICE O/P NEW LOW 30 MIN: CPT | Performed by: OBSTETRICS & GYNECOLOGY

## 2025-05-05 RX ORDER — GUAIFENESIN 600 MG/1
1200 TABLET, EXTENDED RELEASE ORAL EVERY 12 HOURS SCHEDULED
COMMUNITY

## 2025-05-05 RX ORDER — BENZONATATE 100 MG/1
CAPSULE ORAL
COMMUNITY
Start: 2025-03-24

## 2025-05-15 ENCOUNTER — HOSPITAL ENCOUNTER (OUTPATIENT)
Dept: ULTRASOUND IMAGING | Facility: HOSPITAL | Age: 80
Discharge: HOME | End: 2025-05-15
Attending: OBSTETRICS & GYNECOLOGY
Payer: MEDICARE

## 2025-05-15 DIAGNOSIS — R93.89 THICKENED ENDOMETRIUM: ICD-10-CM

## 2025-05-15 PROCEDURE — 76856 US EXAM PELVIC COMPLETE: CPT

## 2025-05-15 PROCEDURE — 76830 TRANSVAGINAL US NON-OB: CPT

## 2025-05-21 ENCOUNTER — RESULTS FOLLOW-UP (OUTPATIENT)
Dept: LABOR AND DELIVERY | Facility: HOSPITAL | Age: 80
End: 2025-05-21

## 2025-05-21 NOTE — RESULT ENCOUNTER NOTE
Called pt to schedule f/u.  answered and said to call back later since pt was not home to schedule appt.

## 2025-05-27 ENCOUNTER — HOSPITAL ENCOUNTER (EMERGENCY)
Facility: HOSPITAL | Age: 80
Discharge: HOME | End: 2025-05-27
Attending: EMERGENCY MEDICINE | Admitting: EMERGENCY MEDICINE
Payer: MEDICARE

## 2025-05-27 ENCOUNTER — APPOINTMENT (EMERGENCY)
Dept: RADIOLOGY | Facility: HOSPITAL | Age: 80
End: 2025-05-27
Payer: MEDICARE

## 2025-05-27 ENCOUNTER — TELEPHONE (OUTPATIENT)
Age: 80
End: 2025-05-27

## 2025-05-27 VITALS
OXYGEN SATURATION: 95 % | TEMPERATURE: 98.6 F | RESPIRATION RATE: 16 BRPM | HEART RATE: 82 BPM | SYSTOLIC BLOOD PRESSURE: 132 MMHG | DIASTOLIC BLOOD PRESSURE: 63 MMHG

## 2025-05-27 DIAGNOSIS — L03.115 CELLULITIS OF RIGHT FOOT: Primary | ICD-10-CM

## 2025-05-27 DIAGNOSIS — M79.671 RIGHT FOOT PAIN: ICD-10-CM

## 2025-05-27 LAB
ANION GAP SERPL CALCULATED.3IONS-SCNC: 8 MMOL/L (ref 4–13)
BASOPHILS # BLD AUTO: 0.06 THOUSANDS/ÂΜL (ref 0–0.1)
BASOPHILS NFR BLD AUTO: 1 % (ref 0–1)
BUN SERPL-MCNC: 14 MG/DL (ref 5–25)
CALCIUM SERPL-MCNC: 10.9 MG/DL (ref 8.4–10.2)
CHLORIDE SERPL-SCNC: 102 MMOL/L (ref 96–108)
CO2 SERPL-SCNC: 28 MMOL/L (ref 21–32)
CREAT SERPL-MCNC: 1.09 MG/DL (ref 0.6–1.3)
EOSINOPHIL # BLD AUTO: 0.14 THOUSAND/ÂΜL (ref 0–0.61)
EOSINOPHIL NFR BLD AUTO: 1 % (ref 0–6)
ERYTHROCYTE [DISTWIDTH] IN BLOOD BY AUTOMATED COUNT: 13.2 % (ref 11.6–15.1)
GFR SERPL CREATININE-BSD FRML MDRD: 48 ML/MIN/1.73SQ M
GLUCOSE SERPL-MCNC: 105 MG/DL (ref 65–140)
HCT VFR BLD AUTO: 45.7 % (ref 34.8–46.1)
HGB BLD-MCNC: 15 G/DL (ref 11.5–15.4)
IMM GRANULOCYTES # BLD AUTO: 0.03 THOUSAND/UL (ref 0–0.2)
IMM GRANULOCYTES NFR BLD AUTO: 0 % (ref 0–2)
LYMPHOCYTES # BLD AUTO: 2.22 THOUSANDS/ÂΜL (ref 0.6–4.47)
LYMPHOCYTES NFR BLD AUTO: 17 % (ref 14–44)
MCH RBC QN AUTO: 29.4 PG (ref 26.8–34.3)
MCHC RBC AUTO-ENTMCNC: 32.8 G/DL (ref 31.4–37.4)
MCV RBC AUTO: 89 FL (ref 82–98)
MONOCYTES # BLD AUTO: 0.71 THOUSAND/ÂΜL (ref 0.17–1.22)
MONOCYTES NFR BLD AUTO: 6 % (ref 4–12)
NEUTROPHILS # BLD AUTO: 9.59 THOUSANDS/ÂΜL (ref 1.85–7.62)
NEUTS SEG NFR BLD AUTO: 75 % (ref 43–75)
NRBC BLD AUTO-RTO: 0 /100 WBCS
PLATELET # BLD AUTO: 261 THOUSANDS/UL (ref 149–390)
PMV BLD AUTO: 10.1 FL (ref 8.9–12.7)
POTASSIUM SERPL-SCNC: 4.3 MMOL/L (ref 3.5–5.3)
RBC # BLD AUTO: 5.11 MILLION/UL (ref 3.81–5.12)
SODIUM SERPL-SCNC: 138 MMOL/L (ref 135–147)
WBC # BLD AUTO: 12.75 THOUSAND/UL (ref 4.31–10.16)

## 2025-05-27 PROCEDURE — 99284 EMERGENCY DEPT VISIT MOD MDM: CPT

## 2025-05-27 PROCEDURE — 73630 X-RAY EXAM OF FOOT: CPT

## 2025-05-27 PROCEDURE — 85025 COMPLETE CBC W/AUTO DIFF WBC: CPT

## 2025-05-27 PROCEDURE — 36415 COLL VENOUS BLD VENIPUNCTURE: CPT

## 2025-05-27 PROCEDURE — 80048 BASIC METABOLIC PNL TOTAL CA: CPT

## 2025-05-27 PROCEDURE — 96374 THER/PROPH/DIAG INJ IV PUSH: CPT

## 2025-05-27 RX ORDER — OXYCODONE AND ACETAMINOPHEN 5; 325 MG/1; MG/1
1 TABLET ORAL EVERY 6 HOURS PRN
Qty: 10 TABLET | Refills: 0 | Status: SHIPPED | OUTPATIENT
Start: 2025-05-27

## 2025-05-27 RX ORDER — CEPHALEXIN 500 MG/1
500 CAPSULE ORAL EVERY 6 HOURS SCHEDULED
Qty: 28 CAPSULE | Refills: 0 | Status: SHIPPED | OUTPATIENT
Start: 2025-05-27 | End: 2025-06-03

## 2025-05-27 RX ADMIN — MORPHINE SULFATE 2 MG: 2 INJECTION, SOLUTION INTRAMUSCULAR; INTRAVENOUS at 11:36

## 2025-05-27 NOTE — ED PROVIDER NOTES
Time reflects when diagnosis was documented in both MDM as applicable and the Disposition within this note       Time User Action Codes Description Comment    5/27/2025  1:25 PM Mireya Porter Add [L03.115] Cellulitis of right foot     5/27/2025  1:25 PM Mireya Porter Add [M79.671] Right foot pain           ED Disposition       ED Disposition   Discharge    Condition   Stable    Date/Time   Tue May 27, 2025  1:25 PM    Comment   Monik Hoff discharge to home/self care.                   Assessment & Plan       Medical Decision Making  Patient is an 79-year-old female presenting with right foot pain, swelling, redness without known injury.  She is not having fevers or systemic symptoms.  Vitals are within normal limits on arrival and she is in no acute distress.  DDx: Fracture, gout, cellulitis.  No calf pain, swelling, tenderness concerning for DVT.  Will check basic labs, x-ray, and treat symptomatically.      She has mild leukocytosis. X-ray without acute fracture or dislocation as interpreted by myself.  Will treat with Keflex to cover for possible cellulitis as redness and swelling involves the entire foot.  Unable to treat patient with NSAIDs for possible gout due to her GFR, will discharge with Percocet.  Advised to monitor for signs of worsening infection that would warrant return to the ED.  Placed referral and provided contact information for podiatry for follow-up.    I have discussed findings and plan for discharge with the patient/caregiver. Follow up with the appropriate providers including primary care physician was discussed. Return precautions discussed with patient/caregiver as outlined in AVS. Patient/caregiver verbally expressed understanding. Patient stable at time of discharge and ambulated out of the emergency department.     Amount and/or Complexity of Data Reviewed  Labs: ordered. Decision-making details documented in ED Course.  Radiology: ordered and independent interpretation performed.  Decision-making details documented in ED Course.    Risk  Prescription drug management.        ED Course as of 05/27/25 1441   Tue May 27, 2025   1144 WBC(!): 12.75   1302 XR foot 3+ views RIGHT  No acute fractures or dislocations as interpreted by myself.         Medications   morphine injection 2 mg (2 mg Intravenous Given 5/27/25 1136)       ED Risk Strat Scores                    No data recorded                            History of Present Illness       Chief Complaint   Patient presents with    Wound Check     Concerned for wound to right foot. Sent by home health nurse for further eval.        Past Medical History[1]   Past Surgical History[2]   Family History[3]   Social History[4]   E-Cigarette/Vaping    E-Cigarette Use Never User       E-Cigarette/Vaping Substances    Nicotine No     THC No     CBD No     Flavoring No     Other No     Unknown No       I have reviewed and agree with the history as documented.     Patient is an 79-year-old female with a history of Alzheimer's presenting for evaluation of right foot pain.  Per daughter, pain started on the medial aspect of the foot 5 days ago.  It started with redness and swelling diffusely over the entire foot yesterday.  She is not having fevers or systemic symptoms.  She denies injury to the foot.  No open wounds. She is taking Tylenol at home without relief.      Wound Check         Review of Systems   Constitutional:  Negative for chills and fever.   HENT:  Negative for congestion, ear pain and sore throat.    Eyes:  Negative for pain and visual disturbance.   Respiratory:  Negative for cough and shortness of breath.    Cardiovascular:  Negative for chest pain and palpitations.   Gastrointestinal:  Negative for abdominal pain and vomiting.   Genitourinary:  Negative for dysuria and hematuria.   Musculoskeletal:  Negative for arthralgias and back pain.   Skin:  Negative for color change and rash.   Neurological:  Negative for seizures and syncope.   All  other systems reviewed and are negative.          Objective       ED Triage Vitals   Temperature Pulse Blood Pressure Respirations SpO2 Patient Position - Orthostatic VS   05/27/25 1057 05/27/25 1057 05/27/25 1058 05/27/25 1057 05/27/25 1057 05/27/25 1327   98.6 °F (37 °C) 102 147/62 18 92 % Lying      Temp src Heart Rate Source BP Location FiO2 (%) Pain Score    -- 05/27/25 1327 05/27/25 1327 -- 05/27/25 1136     Monitor Right arm  10 - Worst Possible Pain      Vitals      Date and Time Temp Pulse SpO2 Resp BP Pain Score FACES Pain Rating User   05/27/25 1327 -- 82 95 % 16 132/63 -- -- AB   05/27/25 1145 -- -- -- -- -- 10 - Worst Possible Pain -- AB   05/27/25 1136 -- -- -- -- -- 10 - Worst Possible Pain -- AB   05/27/25 1058 -- -- -- -- 147/62 -- -- LILY   05/27/25 1057 98.6 °F (37 °C) 102 92 % 18 -- -- -- LILY            Physical Exam  Vitals and nursing note reviewed.   Constitutional:       General: She is not in acute distress.     Appearance: Normal appearance.   HENT:      Head: Normocephalic and atraumatic.      Right Ear: External ear normal.      Left Ear: External ear normal.      Nose: Nose normal.     Eyes:      General: No scleral icterus.        Right eye: No discharge.         Left eye: No discharge.       Cardiovascular:      Rate and Rhythm: Normal rate.      Pulses: Normal pulses.   Pulmonary:      Effort: Pulmonary effort is normal. No respiratory distress.     Musculoskeletal:         General: No deformity or signs of injury.      Cervical back: Normal range of motion and neck supple.      Right foot: Normal range of motion. Swelling and tenderness present.      Comments: Diffuse redness and swelling of right foot. Tenderness to medial aspect of foot.     Skin:     General: Skin is dry.      Coloration: Skin is not jaundiced.      Findings: No erythema or rash.     Neurological:      General: No focal deficit present.      Mental Status: She is alert and oriented to person, place, and time. Mental  status is at baseline.      Motor: No weakness.      Gait: Gait normal.     Psychiatric:         Mood and Affect: Mood normal.         Behavior: Behavior normal.         Thought Content: Thought content normal.         Results Reviewed       Procedure Component Value Units Date/Time    Basic metabolic panel [[id number]]  (Abnormal) Collected: 05/27/25 1136    Lab Status: Final result Specimen: Blood from Arm, Right Updated: 05/27/25 1208     Sodium 138 mmol/L      Potassium 4.3 mmol/L      Chloride 102 mmol/L      CO2 28 mmol/L      ANION GAP 8 mmol/L      BUN 14 mg/dL      Creatinine 1.09 mg/dL      Glucose 105 mg/dL      Calcium 10.9 mg/dL      eGFR 48 ml/min/1.73sq m     Narrative:      National Kidney Disease Foundation guidelines for Chronic Kidney Disease (CKD):     Stage 1 with normal or high GFR (GFR > 90 mL/min/1.73 square meters)    Stage 2 Mild CKD (GFR = 60-89 mL/min/1.73 square meters)    Stage 3A Moderate CKD (GFR = 45-59 mL/min/1.73 square meters)    Stage 3B Moderate CKD (GFR = 30-44 mL/min/1.73 square meters)    Stage 4 Severe CKD (GFR = 15-29 mL/min/1.73 square meters)    Stage 5 End Stage CKD (GFR <15 mL/min/1.73 square meters)  Note: GFR calculation is accurate only with a steady state creatinine    CBC and differential [[id number]]  (Abnormal) Collected: 05/27/25 1136    Lab Status: Final result Specimen: Blood from Arm, Right Updated: 05/27/25 1144     WBC 12.75 Thousand/uL      RBC 5.11 Million/uL      Hemoglobin 15.0 g/dL      Hematocrit 45.7 %      MCV 89 fL      MCH 29.4 pg      MCHC 32.8 g/dL      RDW 13.2 %      MPV 10.1 fL      Platelets 261 Thousands/uL      nRBC 0 /100 WBCs      Segmented % 75 %      Immature Grans % 0 %      Lymphocytes % 17 %      Monocytes % 6 %      Eosinophils Relative 1 %      Basophils Relative 1 %      Absolute Neutrophils 9.59 Thousands/µL      Absolute Immature Grans 0.03 Thousand/uL      Absolute Lymphocytes 2.22 Thousands/µL      Absolute Monocytes 0.71  Thousand/µL      Eosinophils Absolute 0.14 Thousand/µL      Basophils Absolute 0.06 Thousands/µL             XR foot 3+ views RIGHT   ED Interpretation by Mireya Porter PA-C (05/27 2872)   No acute fractures or dislocations as interpreted by myself.        Final Interpretation by Soren Sotelo MD (05/27 8418)      No acute osseous abnormality.         Computerized Assisted Algorithm (CAA) may have been used to analyze all applicable images.         Workstation performed: [id number]             Procedures    ED Medication and Procedure Management   Prior to Admission Medications   Prescriptions Last Dose Informant Patient Reported? Taking?   Artificial Tear Solution (GenTeal Tears) 0.1-0.2-0.3 % SOLN   Yes No   Sig: Administer 1 drop to both eyes every 6 (six) hours as needed (dry eyes)   Patient not taking: Reported on 5/5/2025   Cholecalciferol 50 MCG (2000 UT) CAPS   Yes No   Sig: Take 1 capsule by mouth daily   LACTOBACILLUS PROBIOTIC PO   Yes No   Sig: Take 1 capsule by mouth daily   Multiple Vitamins-Minerals (multivitamin) tablet   Yes No   Sig: Take 1 tablet by mouth daily   Turmeric (QC Tumeric Complex) 500 MG CAPS   Yes No   Sig: Take 1 tablet by mouth daily   benzonatate (TESSALON PERLES) 100 mg capsule   Yes No   Sig: take 1 capsule by mouth three times a day as needed for cough   guaiFENesin (MUCINEX) 600 mg 12 hr tablet   Yes No   Sig: Take 1,200 mg by mouth every 12 (twelve) hours   hydrOXYzine HCL (ATARAX) 10 mg tablet   Yes No   Sig: Take 10 mg by mouth every 6 (six) hours as needed for anxiety   vitamin B-12 (CYANOCOBALAMIN) 500 MCG TABS   No No   Sig: Take 1 tablet (500 mcg total) by mouth daily      Facility-Administered Medications: None     Discharge Medication List as of 5/27/2025  1:36 PM        START taking these medications    Details   cephalexin (KEFLEX) 500 mg capsule Take 1 capsule (500 mg total) by mouth every 6 (six) hours for 7 days, Starting Tue 5/27/2025, Until Tue  6/3/2025, Normal      oxyCODONE-acetaminophen (Percocet) 5-325 mg per tablet Take 1 tablet by mouth every 6 (six) hours as needed for moderate pain for up to 10 doses Max Daily Amount: 4 tablets, Starting Tue 5/27/2025, Normal           CONTINUE these medications which have NOT CHANGED    Details   Artificial Tear Solution (GenTeal Tears) 0.1-0.2-0.3 % SOLN Administer 1 drop to both eyes every 6 (six) hours as needed (dry eyes), Historical Med      benzonatate (TESSALON PERLES) 100 mg capsule take 1 capsule by mouth three times a day as needed for cough, Historical Med      Cholecalciferol 50 MCG (2000 UT) CAPS Take 1 capsule by mouth daily, Historical Med      guaiFENesin (MUCINEX) 600 mg 12 hr tablet Take 1,200 mg by mouth every 12 (twelve) hours, Historical Med      hydrOXYzine HCL (ATARAX) 10 mg tablet Take 10 mg by mouth every 6 (six) hours as needed for anxiety, Starting Mon 1/20/2025, Historical Med      LACTOBACILLUS PROBIOTIC PO Take 1 capsule by mouth daily, Historical Med      Multiple Vitamins-Minerals (multivitamin) tablet Take 1 tablet by mouth daily, Historical Med      Turmeric (QC Tumeric Complex) 500 MG CAPS Take 1 tablet by mouth daily, Historical Med      vitamin B-12 (CYANOCOBALAMIN) 500 MCG TABS Take 1 tablet (500 mcg total) by mouth daily, Starting Tue 4/15/2025, Normal             ED SEPSIS DOCUMENTATION   Time reflects when diagnosis was documented in both MDM as applicable and the Disposition within this note       Time User Action Codes Description Comment    5/27/2025  1:25 PM Mireya Porter Add [L03.115] Cellulitis of right foot     5/27/2025  1:25 PM Mireya Porter Add [M79.671] Right foot pain                    [1] No past medical history on file.  [2] No past surgical history on file.  [3] No family history on file.  [4]   Social History  Tobacco Use    Smoking status: Former     Types: Cigarettes    Smokeless tobacco: Never   Vaping Use    Vaping status: Never Used   Substance Use  Topics    Alcohol use: Not Currently    Drug use: Never        Mireya Porter PA-C  05/27/25 5640

## 2025-05-27 NOTE — DISCHARGE INSTRUCTIONS
Take antibiotics for possible skin infection. Monitor for signs of worsening infection including increased redness, red streaking up the leg, worsening swelling, pain, fevers. Take Percocet as needed for pain.  Follow-up closely with podiatry for further evaluation and management.

## 2025-05-27 NOTE — TELEPHONE ENCOUNTER
Caller: Cassandra Ramírez for Monik Hoff    Doctor: Dr. Mercado    Reason for call: Can Monik be forced on next week?  She has a skin infection and was advised by the ED to be seen next week.  Can someone please reach out to Cassandra? Thank you.     Call back#: [phone]

## 2025-06-04 ENCOUNTER — PROCEDURE VISIT (OUTPATIENT)
Dept: PODIATRY | Facility: CLINIC | Age: 80
End: 2025-06-04
Payer: MEDICARE

## 2025-06-04 ENCOUNTER — TELEPHONE (OUTPATIENT)
Age: 80
End: 2025-06-04

## 2025-06-04 DIAGNOSIS — M10.9 ACUTE GOUT OF RIGHT FOOT, UNSPECIFIED CAUSE: Primary | ICD-10-CM

## 2025-06-04 PROCEDURE — 99204 OFFICE O/P NEW MOD 45 MIN: CPT | Performed by: PODIATRIST

## 2025-06-04 RX ORDER — METHYLPREDNISOLONE 4 MG/1
TABLET ORAL
Qty: 21 TABLET | Refills: 0 | Status: SHIPPED | OUTPATIENT
Start: 2025-06-04

## 2025-06-04 NOTE — PROGRESS NOTES
Name: Monik Hoff      : 1945      MRN: [id number]  Encounter Provider: Israel Mercado DPM  Encounter Date: 2025   Encounter department: [facility]    MATTHEW personally viewed x-rays of the right foot dated 2025.  They were negative for fracture or osseous pathology.    Explained to patient today that differential to includes ankle sprain; fracture; cellulitis; and gout    Current symptoms and history suggest that gout is causing this patient's discomfort.  She was told to complete the prednisone prescription.  Prescribed an additional Medrol Dosepak so patient can wean off the drug.  She will be rescheduled in 3 weeks.  :  Assessment & Plan  Acute gout of right foot, unspecified cause  Prednisone  Orders:    methylPREDNISolone 4 MG tablet therapy pack; Use as directed on package        History of Present Illness   HPI  Monik Hoff is a 79 y.o. female who presents with an aide.  Patient has pain and swelling in her right great toe and right ankle.  According to her aide, patient fell from a chair approximately 7 to 10 days ago.  She also was on an exercise bike and overdid it.  Initially, the right great toe became very swollen and painful as did the right ankle.  Patient could barely walk.  Her medical doctor placed her on both cephalexin for fear of cellulitis and also prednisone for fear of gout.  The prednisone diagnosis was 40 mg daily for 7 days.  The patient has not completed her cephalexin for the prednisone prescription.  There has been improvement over the past 24 to 48 hours and patient can now bear weight on the foot.  The right great toe is still swollen and painful and the right ankle is still erythematous.    On questioning, patient relates a history of gout with last attack  2 years ago.  There is no history of diabetes.    Soon after her injury, patient did have x-rays which were said to be negative for fracture.      Review of Systems   Psychiatric/Behavioral:           Mild late onset Alzheimer's                  Objective   LMP  (LMP Unknown)      Physical Exam  Constitutional:       Appearance: Normal appearance.     Cardiovascular:      Pulses: Normal pulses.     Musculoskeletal:         General: Swelling and tenderness present.      Comments: Right ankle is edematous and erythematous as his right hallux.     Skin:     General: Skin is warm.     Neurological:      General: No focal deficit present.      Mental Status: She is oriented to person, place, and time.

## 2025-06-04 NOTE — TELEPHONE ENCOUNTER
Caller: Sweetie Beasley    Doctor and/or Office: Dr. Mercado/Adalberto    #: [phone]    Escalation: Appointment Patient needs a 3 week follow up in [place]. Please return call to Sweetie. Thank you

## 2025-06-13 ENCOUNTER — OFFICE VISIT (OUTPATIENT)
Age: 80
End: 2025-06-13
Payer: MEDICARE

## 2025-06-13 VITALS
BODY MASS INDEX: 24.27 KG/M2 | SYSTOLIC BLOOD PRESSURE: 124 MMHG | DIASTOLIC BLOOD PRESSURE: 68 MMHG | WEIGHT: 154.6 LBS | HEIGHT: 67 IN

## 2025-06-13 DIAGNOSIS — R93.89 THICKENED ENDOMETRIUM: Primary | ICD-10-CM

## 2025-06-13 PROCEDURE — 99213 OFFICE O/P EST LOW 20 MIN: CPT | Performed by: OBSTETRICS & GYNECOLOGY

## 2025-06-13 RX ORDER — PREDNISONE 20 MG/1
TABLET ORAL
COMMUNITY
Start: 2025-05-29

## 2025-06-13 NOTE — LETTER
To: [facility]    Monik Hoff is under my professional care, and was seen in my office on 6/13/25. Based on my interaction with and evaluation of Monik, I do not believe she has the capacity to make her own medical decisions, especially about a possible surgery. I could not find documentation of who Monik's medical decision maker is, and Monik could not tell me. Monik needs to be scheduled for a follow up visit with me to discuss a possible procedure, and her medical decision maker needs to be present for this discussion, either in person or by phone. This visit should occur in the next 1-2 months. Please also send an updated list of Monik's medications and medical history to her next visit, as she is not able to confirm this information for us today.    Senait Farias MD  6/13/2025

## 2025-06-13 NOTE — PROGRESS NOTES
"Subjective    Patient is a 78 yo who presents today to discuss results of a recent pelvic ultrasound. The ultrasound was initially obtained in the setting of an incidentally noted thickened endometrium on a CT scan. She has not had any bleeding. The US demonstrated:    FINDINGS:     UTERUS:  The uterus is anteverted in position, measuring x x 4.1 cm.  The uterus has a normal contour and echotexture.  The cervix appears within normal limits.     ENDOMETRIUM:  The endometrial echo complex has an AP caliber of 3 mm.  There is a small amount of fluid in the endometrial cavity and a polypoid structure is identified measuring 1.4 x 0.8 x 1.0 cm. This has tiny cystic areas within it and associated internal vascularity.     OVARIES/ADNEXA:  Right ovary: 6.1 x 4.8 x 3.9 cm. 59.0 mL.  Ovarian Doppler flow is within normal limits.  Right ovarian simple cyst measures 5.7 x 3.6 x 4.5 cm.     Left ovary: 2.2 x 1.3 x 1.2 cm. 1.7 mL.  Ovarian Doppler flow is within normal limits.  No suspicious ovarian or adnexal abnormality.     OTHER:  Small amount of free fluid in the pelvis.     IMPRESSION:     1.  Polypoid structure in the endometrial cavity with associated vascularity suspicious for a polyp. Recommend hysteroscopy and histologic correlation.  2.  Right ovarian simple cyst measuring up to 5.7 cm. Recommend follow-up ultrasound in 12 months.    OB History          2    Para        Term                AB        Living   2         SAB        IAB        Ectopic        Multiple        Live Births   2                        Objective    Vitals:    25 0952   BP: 124/68   BP Location: Right arm   Patient Position: Sitting   Cuff Size: Adult   Weight: 70.1 kg (154 lb 9.6 oz)   Height: 5' 7\" (1.702 m)        Physical Exam  Constitutional:       Appearance: She is well-developed.     Cardiovascular:      Rate and Rhythm: Normal rate.   Pulmonary:      Effort: Pulmonary effort is normal. No respiratory distress. "     Skin:     General: Skin is warm and dry.          Assessment    Problem List[1]     Plan  - f/u US in 1 year for right ovarian cyst  - discussed finding of likely polyp on US; discussed hysteroscopic removal vs endometrial biopsy; patient does not appear to have capacity to make her own medical decisions, and no one accompanied her to her visit today; will schedule another visit when her medical decision make is present         [1]   Patient Active Problem List  Diagnosis    MCI (mild cognitive impairment) with memory loss    Nuclear sclerotic cataract of right eye    Mild late onset Alzheimer's dementia without behavioral disturbance, psychotic disturbance, mood disturbance, or anxiety (HCC)    Driving safety issue    Acute gout of right foot

## 2025-06-23 ENCOUNTER — TELEPHONE (OUTPATIENT)
Dept: OBGYN CLINIC | Facility: CLINIC | Age: 80
End: 2025-06-23

## 2025-06-23 NOTE — TELEPHONE ENCOUNTER
LVM for patients PAPA Trujillo. Advised her to call back and schedule appointment. Per Dr. Hernandez's notes, pt needs to come back in within the next month or 2 to discuss findings and possible procedure. Medical decision maker must be present at time of visit.

## 2025-06-25 ENCOUNTER — PROCEDURE VISIT (OUTPATIENT)
Dept: PODIATRY | Facility: CLINIC | Age: 80
End: 2025-06-25
Payer: MEDICARE

## 2025-06-25 VITALS — BODY MASS INDEX: 24.91 KG/M2 | WEIGHT: 155 LBS | HEIGHT: 66 IN

## 2025-06-25 DIAGNOSIS — M10.9 ACUTE GOUT OF RIGHT FOOT, UNSPECIFIED CAUSE: Primary | ICD-10-CM

## 2025-06-25 PROCEDURE — 99213 OFFICE O/P EST LOW 20 MIN: CPT | Performed by: PODIATRIST

## 2025-06-25 NOTE — PROGRESS NOTES
"Name: Monik Hoff      : 1945      MRN: [id number]  Encounter Provider: Israel Mercado DPM  Encounter Date: 2025   Encounter department: [facility]    Explained to patient that she is dealing with a resolved gout attack.  Explained that attack such as this could recur but she should contact me should she have a flare.  Do not feel allopurinol necessary at this time.  Reappoint as needed  :  Assessment & Plan  Acute gout of right foot, unspecified cause  Assessment           History of Present Illness   HPI  Monik Hoff is a 79 y.o. female who presents for assessment of her right great toe and right ankle.  At last visit, patient was diagnosed with an acute gout attack of both the right great toe and ankle.  She was placed on a Medrol Dosepak and now is asymptomatic and comfortable.      Review of Systems       Objective   Ht 5' 6\" (1.676 m)   Wt 70.3 kg (155 lb)   LMP  (LMP Unknown)   BMI 25.02 kg/m²      Physical Exam    Musculoskeletal:         General: No tenderness.      Comments: No edema or erythema right great toe or ankle.     Explained to patient that she is a resolved gout attack      "